# Patient Record
Sex: MALE | Race: OTHER | URBAN - METROPOLITAN AREA
[De-identification: names, ages, dates, MRNs, and addresses within clinical notes are randomized per-mention and may not be internally consistent; named-entity substitution may affect disease eponyms.]

---

## 2018-04-24 ENCOUNTER — OFFICE VISIT (OUTPATIENT)
Dept: FAMILY MEDICINE CLINIC | Age: 38
End: 2018-04-24

## 2018-04-24 ENCOUNTER — HOSPITAL ENCOUNTER (OUTPATIENT)
Dept: LAB | Age: 38
Discharge: HOME | End: 2018-04-24

## 2018-04-24 VITALS
SYSTOLIC BLOOD PRESSURE: 112 MMHG | DIASTOLIC BLOOD PRESSURE: 70 MMHG | HEIGHT: 65 IN | TEMPERATURE: 97.8 F | HEART RATE: 50 BPM | WEIGHT: 141.6 LBS | BODY MASS INDEX: 23.59 KG/M2

## 2018-04-24 DIAGNOSIS — N50.811 PAIN IN RIGHT TESTICLE: Primary | ICD-10-CM

## 2018-04-24 DIAGNOSIS — N50.811 PAIN IN RIGHT TESTICLE: ICD-10-CM

## 2018-04-24 LAB
APPEARANCE UR: CLEAR
BILIRUB UR QL: NEGATIVE
COLOR UR: NORMAL
GLUCOSE UR STRIP.AUTO-MCNC: NEGATIVE MG/DL
HGB UR QL STRIP: NEGATIVE
KETONES UR QL STRIP.AUTO: NEGATIVE MG/DL
LEUKOCYTE ESTERASE UR QL STRIP.AUTO: NEGATIVE
NITRITE UR QL STRIP.AUTO: NEGATIVE
PH UR STRIP: 6.5 [PH] (ref 5–8)
PROT UR STRIP-MCNC: NEGATIVE MG/DL
SP GR UR REFRACTOMETRY: 1.01 (ref 1–1.03)
UROBILINOGEN UR QL STRIP.AUTO: 0.2 EU/DL (ref 0.2–1)

## 2018-04-24 PROCEDURE — 87147 CULTURE TYPE IMMUNOLOGIC: CPT | Performed by: NURSE PRACTITIONER

## 2018-04-24 PROCEDURE — 87086 URINE CULTURE/COLONY COUNT: CPT | Performed by: NURSE PRACTITIONER

## 2018-04-24 PROCEDURE — 81003 URINALYSIS AUTO W/O SCOPE: CPT | Performed by: NURSE PRACTITIONER

## 2018-04-24 PROCEDURE — 87491 CHLMYD TRACH DNA AMP PROBE: CPT | Performed by: NURSE PRACTITIONER

## 2018-04-24 RX ORDER — NAPROXEN 500 MG/1
500 TABLET ORAL
Qty: 40 TAB | Refills: 1 | Status: SHIPPED | OUTPATIENT
Start: 2018-04-24 | End: 2018-06-12

## 2018-04-24 NOTE — PROGRESS NOTES
Printed AVS, provided to pt and reviewed. Pt indicated understanding and had no questions. Told pt that rx's have been sent to pharmacy and they should be ready for  in approximately 2 hrs. Reviewed medication ordered today with the pt.  #[id number].  Jonatan Haynes RN

## 2018-04-24 NOTE — PROGRESS NOTES
Subjective:     Chief Complaint   Patient presents with    Testicle Pain     Right side x 1 week        He  is a 43 y.o. male who presents for evaluation of testicular pain. Onset approx 1-2 weeks ago. No acute injury nor trauma to testicles, pain mostly in R. Pt unable to recall circumstances of first onset of pain, believes he was working. Pain only in R, L is WNL/non-painful. Pain is intermittent. Pain is worse while lifting heavy items, usually 50-80#. Pt works in construction/zita work. Notes some mild swelling,     No assoc dysuria, penile discharge nor frequency. Denies any palpable nodules/cyst on testicles. ROS  Gen - no fever/chills  Resp - no dyspnea or cough  CV - no chest pain or BENNETT  Rest per HPI    No past medical history on file. No past surgical history on file. No current outpatient prescriptions on file prior to visit. No current facility-administered medications on file prior to visit. Objective:     Vitals:    04/24/18 1202   BP: 112/70   Pulse: (!) 50   Temp: 97.8 °F (36.6 °C)   TempSrc: Oral   Weight: 141 lb 9.6 oz (64.2 kg)   Height: 5' 4.76\" (1.645 m)       Physical Examination:  General appearance - alert, well appearing, and in no distress  Eyes -sclera anicteric  Neck - supple, no significant adenopathy, no thyromegaly  Chest - clear to auscultation, no wheezes, rales or rhonchi, symmetric air entry  Heart - normal rate, regular rhythm, normal S1, S2, no murmurs, rubs, clicks or gallops  Neurological - alert, oriented, no focal findings or movement disorder noted  Abdomen-BS present/WNL x 4 quads, non-tender/distended, soft,no organomegaly    : testicles WNL, unable to appreciate mass nor hernia. No penile discharge, uncircumcised. Abdominal exam unremarkable. Assessment/ Plan:   Diagnoses and all orders for this visit:    1. Pain in right testicle  -     CHLAMYDIA/GC PCR; Future  -     CULTURE, URINE;  Future  -     URINALYSIS W/ RFLX MICROSCOPIC; Future  -     naproxen (NAPROSYN) 500 mg tablet; Take 1 Tab by mouth two (2) times daily as needed. Take w/ food. [place] 1 tableta por boca 2 veces al trina con comida por necesidad para dolor. symptoms appear to be hernia like, but unable to appreciate any hernia on /abdominal exam.     PRN Naproxen for discomfort and non-pharm remedies (heat/ice etc) for pain. Discussed emergency S&S warranting ER eval.     Re-eval in 4-6 weeks, may consider imaging if pain is not improved/resolving. I have discussed the diagnosis with the patient and the intended plan as seen in the above orders. The patient has received an after-visit summary and questions were answered concerning future plans. I have discussed medication side effects and warnings with the patient as well. The patient verbalizes understanding and agreement with the plan.     Follow-up Disposition: Not on File

## 2018-04-24 NOTE — PATIENT INSTRUCTIONS
Dolor testicular: Instrucciones de cuidado - [ Testicular Pain: Care Instructions ]  Instrucciones de cuidado    El dolor en los testículos puede deberse a muchas cosas. Estas incluyen jb lesión en los testículos, jb infección y la torsión testicular. Las lesiones y los problemas genitales suelen ocurrir regina actividades deportivas o recreativas, en el trabajo o en jb caída. El dolor causado por jb lesión suele desaparecer rápidamente. Por lo general, no hay daño a terri plazo para los testículos. Las infecciones que pueden causar dolor incluyen:  · Jb infección de los testículos. [place] se conoce hugo orquitis. · Un absceso en el escroto o los testículos. · Algunas infecciones de transmisión sexual (STI, por summer siglas en inglés). · Jb inflamación del tubo conectado a un testículo. Esta inflamación se conoce hugo epididimitis. Puede causar dolor y a veces es causada por jb infección. La torsión testicular ocurre cuando un testículo se tuerce en el cordón espermático. [place] interrumpe el suministro de carmen al testículo. La torsión testicular es jb afección grave que requiere [place]. La atención de seguimiento es jb parte clave de garay tratamiento y seguridad. Asegúrese de hacer y acudir a todas las citas, y llame a garay médico si está teniendo problemas. También es jb buena idea saber los resultados de los exámenes y mantener jb lista de los medicamentos que dex. ¿Cómo puede cuidarse en el hogar? · Descanse y proteja summer testículos e kourtney. Interrumpa, modifique o suspenda cualquier actividad que pueda estar causándole dolor o sensibilidad. · Colóquese hielo o jb compresa fría en la carla por 10 a 20 minutos cada vez. Coloque un paño villeda entre el hielo y la piel. · Use calzoncillos, no bóxers. Los calzoncillos ayudan a apoyar la carla lesionada. Puede usar un suspensorio si le ayuda a aliviar el dolor. · Si garay médico le recetó antibióticos, tómelos según las indicaciones.  No deje de tomarlos solo porque se sienta mejor. Debe jean pierre todos los antibióticos hasta terminarlos. · Pregúntele a garay médico si puede jean pierre un analgésico (medicamento para el dolor) de venta el, hugo acetaminofén (Tylenol), ibuprofeno (Advil, Motrin) o naproxeno (Aleve). Sea yesenia con los medicamentos. Qi y siga todas las instrucciones de la [place]. · Si el médico le recetó un analgésico, tómelo según las indicaciones. ¿Cuándo debe pedir ayuda? Llame a garay médico ahora mismo o busque atención médica inmediata si:  ? · Tiene dolor intenso o en aumento. ? · Nota un cambio en cómo se bakari summer testículos o se sitúan en garay escroto. ? · Nota nueva o peor inflamación en el escroto. ? · Tiene síntomas de un problema urinario, tales hugo jb infección de las vías urinarias. Estos pueden incluir:  ¨ Dolor o ardor al orinar. ¨ Necesidad de orinar con frecuencia sin poder eliminar mucha orina. ¨ Dolor en el flanco, que se encuentra eleazar debajo de la caja torácica y [place] de la cintura en ambos lados de la espalda. ¨ Harvinder en la orina. Kailyn Raid. ? Vigile muy de cerca los cambios en garay cesra, y asegúrese de comunicarse con garay médico si:  ? · No mejora hugo se esperaba. ¿Dónde puede encontrar más información en inglés? [place] a http://le-brandi.info/. Arash Sanchez P977 en la búsqueda para aprender Armen Stroud de \"Dolor testicular: Instrucciones de cuidado - [ Testicular Pain: Care Instructions ]. \"  Revisado: 12 lee, 2017  Versión del contenido: 11.4  © 9289-9448 Healthwise, Incorporated. Las instrucciones de cuidado fueron adaptadas bajo licencia por [employer] (which disclaims liability or warranty for this information). Si usted tiene [place] afección médica o sobre estas instrucciones, siempre pregunte a garay profesional de cesar. Healthwise, Incorporated niega toda garantía o responsabilidad por garay uso de esta información.

## 2018-04-24 NOTE — PROGRESS NOTES
Coordination of Care  1. Have you been to the ER, urgent care clinic since your last visit? Hospitalized since your last visit? No    2. Have you seen or consulted any other health care providers outside of the [facility] since your last visit? Include any pap smears or colon screening. No    Does the patient need refills? NO    Learning Assessment Complete?  yes  Depression Screening complete in the past 12 months? no

## 2018-04-24 NOTE — MR AVS SNAPSHOT
[address] [place] [address] 210 1400 [address] [place] [address] 
[phone] Patient: Bernadette Plasencia MRN: [id number] :[id number] Visit Information Rosetta Smith Personal Médico Departamento Teléfono del Dep. Número de visita 2018 11:00 AM Jil Low NP [facility] [phone] Upcoming Health Maintenance Date Due DTaP/Tdap/Td series (1 - Tdap) 2000 Influenza Age 5 to Adult 2017 Alerg[place]  Review Complete El: 2018 Por: Jil Lwo NP  
 [facility] Chaka del:  2018 No Known Allergies Vacunas actuales Melinda Ready No hay ninguna vacuna archivada. No revisadas esta visita You Were Diagnosed With   
  
 [facility] Pain in right testicle    -  Primary ICD-10-CM: N50.811 ICD-9-CM: 608.9 Partes vitales PS Pulso Temperatura [place] ( percentil de crecimiento) Peso (percentil de crecimiento) BMI ([facility])  
 112/70 (BP 1 Location: Left arm, BP Patient Position: Sitting) (!) 50 97.8 °F (36.6 °C) (Oral) 5' 4.76\" (1.645 m) 141 lb 9.6 oz (64.2 kg) 23.74 kg/m2 Estatus de tabaquísmo Never Smoker Historial de signos vitales BMI and BSA Data Body Mass Index Body Surface Area  
 23.74 kg/m 2 1.71 m 2 Sara Gutiérrez Pharmacy Name Phone  [facility], [address] [place] [address] [address] [facility] [phone] Cantu lista de medicamentos actualizada Dawna Po actualizada 18  1:35 PM.  Susan Trent use cantu lista de medicamentos más reciente. naproxen 500 mg tablet También conocido hugo:  NAPROSYN Take 1 Tab by mouth two (2) times daily as needed. Take w/ food. [place] 1 tableta por boca 2 veces al trina con comida por necesidad para dolor. Recetas Enviado a la [place]  Refills  
 naproxen (NAPROSYN) 500 mg tablet 1  
 Sig: Take 1 Tab by mouth two (2) times daily as needed. Take w/ food. [place] 1 tableta por boca 2 veces al trina con comida por necesidad para dolor. Class: Normal  
 Pharmacy: [facility] [address], [address] #: [phone] Route: Oral  
  
Por hacer 04/24/2018 Lab:  CHLAMYDIA/GC PCR   
  
 04/24/2018 Microbiology:  CULTURE, URINE   
  
 04/24/2018 Lab:  URINALYSIS W/ RFLX MICROSCOPIC Instrucciones para el Paciente Dolor testicular: Instrucciones de cuidado - [ Testicular Pain: Care Instructions ] Instrucciones de cuidado El dolor en los testículos puede deberse a muchas cosas. Estas incluyen jb lesión en los testículos, jb infección y la torsión testicular. Las lesiones y los problemas genitales suelen ocurrir regina actividades deportivas o recreativas, en el trabajo o en jb caída. El dolor causado por jb lesión suele desaparecer rápidamente. Por lo general, no hay daño a terri plazo para los testículos. Las infecciones que pueden causar dolor incluyen: · Jb infección de los testículos. [place] se conoce hugo orquitis. · Un absceso en el escroto o los testículos. · Algunas infecciones de transmisión sexual (STI, por summer siglas en inglés). · Jb inflamación del tubo conectado a un testículo. Esta inflamación se conoce hugo epididimitis. Puede causar dolor y a veces es causada por jb infección. La torsión testicular ocurre cuando un testículo se tuerce en el cordón espermático. [place] interrumpe el suministro de carmen al testículo. La torsión testicular es jb afección grave que requiere [place]. La atención de seguimiento es jb parte clave de garay tratamiento y seguridad. Asegúrese de hacer y acudir a todas las citas, y llame a garay médico si está teniendo problemas. También es jb buena idea saber los resultados de los exámenes y mantener jb lista de los medicamentos que dex. Cómo puede cuidarse en el hogar? · Descanse y proteja summer testículos e kourtney. Interrumpa, modifique o suspenda cualquier actividad que pueda estar causándole dolor o sensibilidad. · Colóquese hielo o jb compresa fría en la carla por 10 a 20 minutos cada vez. Coloque un paño villeda entre el hielo y la piel. · Use calzoncillos, no bóxers. Los calzoncillos ayudan a apoyar la carla lesionada. Puede usar un suspensorio si le ayuda a aliviar el dolor. · Si garay médico le recetó antibióticos, tómelos según las indicaciones. No deje de tomarlos solo porque se sienta mejor. Debe jean pierre todos los antibióticos hasta terminarlos. · Pregúntele a garay médico si puede jean pierre un analgésico (medicamento para el dolor) de venta el, hugo acetaminofén (Tylenol), ibuprofeno (Advil, Motrin) o naproxeno (Aleve). Sea yesenia con los medicamentos. Qi y siga todas las instrucciones de la [place]. · Si el médico le recetó un analgésico, tómelo según las indicaciones. Cuándo debe pedir ayuda? Llame a garay médico ahora mismo o busque atención médica inmediata si: 
? · Tiene dolor intenso o en aumento. ? · Nota un cambio en cómo se bakari summer testículos o se sitúan en garay escroto. ? · Nota nueva o peor inflamación en el escroto. ? · Tiene síntomas de un problema urinario, tales hugo jb infección de las vías urinarias. Estos pueden incluir: ¨ Dolor o ardor al orinar. ¨ Necesidad de orinar con frecuencia sin poder eliminar mucha orina. ¨ Dolor en el flanco, que se encuentra eleazar debajo de la caja torácica y [place] de la cintura en ambos lados de la espalda. ¨ Harvinder en la orina. Bernadette Pio. ? Vigile muy de cerca los cambios en garay cesar, y asegúrese de comunicarse con garay médico si: 
? · No mejora hugo se esperaba. Dónde puede encontrar más información en inglés? Maylin Wood a http://le-brandi.info/. [facility] Y219 en la búsqueda para aprender Mike Casas de \"Dolor testicular: Instrucciones de cuidado - [ Testicular Pain: Care Instructions ]. \" Revisado: 12 [place], 2017 Versión del contenido: 11.4 © 5310-3051 Healthwise, Incorporated. Las instrucciones de cuidado fueron adaptadas bajo licencia por [employer] (which disclaims liability or warranty for this information). Si usted tiene [place] afección médica o sobre estas instrucciones, siempre pregunte a garay profesional de cesar. Healthwise, Incorporated niega toda garantía o responsabilidad por garay uso de esta información. Introducing [facility]! [facility] introduce portal paciente MyChart . Ahora se puede acceder a partes de garay expediente médico, enviar por correo electrónico la oficina de garay médico y solicitar renovaciones de medicamentos en línea. En garay navegador de Internet , Angle Hurley a [email or web address] Chun clic en el usuario por Darian Holland? Elgie Fleming clic aquí en la sesión Laverda Warren. Verá la página de registro [place]. Ingrese garay código de [employer] moses y hugo aparece a continuación. Usted no tendrá que [employer] código después de srini completado el proceso de registro . Si usted no se inscribe antes de la fecha de caducidad , debe solicitar un nuevo código. · MyChart Código de acceso : [id number] Expires: 7/23/2018  1:35 PM 
 
Ingresa los últimos cuatro dígitos de garay Número de Seguro Social ( xxxx ) y fecha de nacimiento ( dd / mm / aaaa ) hugo se indica y chun clic en Enviar. Cherelle será llevado a la siguiente página de registro . Crear un ID MyChart . Esta será garay ID de inicio de sesión de MyChart y no puede ser [place] , por lo que pensar en jb que es [employer] [place] y fácil de recordar . Crear jb contraseña MyChart . Usted puede cambiar garay contraseña en cualquier momento . Ingrese garay Password Reset de preguntas y [place] . [place] se puede utilizar en un momento posterior si usted olvida garay contraseña.  
Introduzca garay dirección de correo electrónico . Olimpia Corral recibirá Tammy Ser notificación por correo electrónico cuando la nueva información está disponible en MyChart . Grabiel Slater clic en Registrarse. Sumit Nurse esther y descargar porciones de garay expediente médico. 
Jesse clic en el enlace de descarga del menú Resumen para descargar jb copia portátil de garay información médica . Si tiene [employer] , por favor visite la sección de preguntas frecuentes del sitio web [email or web address] . Recuerde, Revaluate NO es que se utilizará para las necesidades urgentes. Para emergencias médicas , llame al 911 . Ahora disponible en garay iPhone y Android ! Por favor proporcione denzel resumen de la documentación de cuidado a garay próximo proveedor. If you have any questions after today's visit, please call [phone].

## 2018-04-25 LAB
BACTERIA SPEC CULT: ABNORMAL
C TRACH DNA SPEC QL NAA+PROBE: NEGATIVE
CC UR VC: ABNORMAL
N GONORRHOEA DNA SPEC QL NAA+PROBE: NEGATIVE
SAMPLE TYPE: NORMAL
SERVICE CMNT-IMP: ABNORMAL
SERVICE CMNT-IMP: NORMAL
SPECIMEN SOURCE: NORMAL

## 2018-04-25 NOTE — PROGRESS NOTES
Pls inform Pt his urine was + for bacteria, which may be provoking his groin pain. Recommend Amox 500mg BID x one week. Pls msg me once Pt receives msg and I will send Abx to pharmacy.      Thank you,  Yaz Taveras

## 2018-05-07 ENCOUNTER — TELEPHONE (OUTPATIENT)
Dept: FAMILY MEDICINE CLINIC | Age: 38
End: 2018-05-07

## 2018-05-07 RX ORDER — AMOXICILLIN 500 MG/1
500 CAPSULE ORAL 2 TIMES DAILY
Qty: 14 CAP | Refills: 0 | Status: SHIPPED | OUTPATIENT
Start: 2018-05-07 | End: 2018-05-14

## 2018-05-07 NOTE — PROGRESS NOTES
Called and spoke with patient (name and  verified) Message made by provider reviewed with patient. [facility] [address].  Thanks Karthikeyan Erazo RN

## 2018-05-07 NOTE — TELEPHONE ENCOUNTER
----- Message from Joel Yañez RN sent at 2018  3:48 PM EDT -----  Delsadie Colon and spoke with patient (name and  verified) Message made by provider reviewed with patient. Please send abx to [facility] at [address].  Thanks Joel Yañez, RN

## 2018-06-12 ENCOUNTER — OFFICE VISIT (OUTPATIENT)
Dept: FAMILY MEDICINE CLINIC | Age: 38
End: 2018-06-12

## 2018-06-12 ENCOUNTER — HOSPITAL ENCOUNTER (OUTPATIENT)
Dept: LAB | Age: 38
Discharge: HOME | End: 2018-06-12

## 2018-06-12 VITALS
HEART RATE: 49 BPM | WEIGHT: 140 LBS | BODY MASS INDEX: 23.9 KG/M2 | DIASTOLIC BLOOD PRESSURE: 58 MMHG | SYSTOLIC BLOOD PRESSURE: 108 MMHG | TEMPERATURE: 97.8 F | HEIGHT: 64 IN

## 2018-06-12 DIAGNOSIS — Z87.440 HISTORY OF UTI: Primary | ICD-10-CM

## 2018-06-12 DIAGNOSIS — N50.811 RIGHT TESTICULAR PAIN: ICD-10-CM

## 2018-06-12 DIAGNOSIS — Z87.440 HISTORY OF UTI: ICD-10-CM

## 2018-06-12 PROCEDURE — 87086 URINE CULTURE/COLONY COUNT: CPT | Performed by: FAMILY MEDICINE

## 2018-06-12 PROCEDURE — 81003 URINALYSIS AUTO W/O SCOPE: CPT | Performed by: FAMILY MEDICINE

## 2018-06-12 RX ORDER — ACETAMINOPHEN 325 MG/1
TABLET ORAL
COMMUNITY

## 2018-06-12 NOTE — PROGRESS NOTES
Coordination of Care  1. Have you been to the ER, urgent care clinic since your last visit? Hospitalized since your last visit? No    2. Have you seen or consulted any other health care providers outside of the [address] [place] [address] since your last visit? Include any pap smears or colon screening. No    Does the patient need refills? YES    Learning Assessment Complete?  yes  Depression Screening complete in the past 12 months? yes

## 2018-06-12 NOTE — PROGRESS NOTES
Bernadette Plasencia is a 43 y.o. male    Issues discussed today include:    Chief Complaint   Patient presents with    Other     follow up for pain in right testicle. Pt states he is feeling much better.  Medication Refill       1) R testicle pain:  Felling much better. S/p amoxicillin x 1 week and prn naproxen. Only having pain infrequenty. Knows STI tests were neg, denies ongoing concern for STIs. Is monogamous with his wife. No penile d/c or lesions. Naproxen affects stomach, so tries not to take. Asks for refill of amox, thinking it helped him a lot. Data reviewed or ordered today:       Other problems include: There is no problem list on file for this patient. Medications:  Current Outpatient Prescriptions   Medication Sig Dispense Refill    acetaminophen (TYLENOL) 325 mg tablet Take  by mouth every four (4) hours as needed for Pain. Allergies:  No Known Allergies    LMP:  No LMP for male patient. Social History     Social History    Marital status:      Spouse name: N/A    Number of children: N/A    Years of education: N/A     Occupational History    Not on file. Social History Main Topics    Smoking status: Never Smoker    Smokeless tobacco: Never Used    Alcohol use 0.0 oz/week     0 Standard drinks or equivalent per week      Comment: social    Drug use: No    Sexual activity: Not on file     Other Topics Concern    Not on file     Social History Narrative       No family history on file.       Physical Exam   Visit Vitals    /58 (BP 1 Location: Left arm)    Pulse (!) 49    Temp 97.8 °F (36.6 °C) (Oral)    Ht 5' 4.37\" (1.635 m)    Wt 140 lb (63.5 kg)    BMI 23.76 kg/m2      BP Readings from Last 3 Encounters:   06/12/18 108/58   04/24/18 112/70   03/29/16 125/76     Constitutional: Appears well,  No acute distress, Vitals noted  Psychiatric:  Affect normal, Alert and Oriented to person/place/time  Eyes:  Conjunctiva clear, no drainage  ENT: External ears and nose normal, Mucous membranes moist  Neck:  General inspection normal. Supple. Lungs:  No respiratory distress   Extremities: Without edema, good peripheral pulses  Skin:  Warm to palpation, without rashes      Assessment/Plan:      ICD-10-CM ICD-9-CM    1. History of UTI Z87.440 V13.02 URINALYSIS W/ RFLX MICROSCOPIC      CULTURE, URINE   2. Right testicular pain N50.811 608.9 URINALYSIS W/ RFLX MICROSCOPIC      CULTURE, URINE    Improved, intermittent       Intermittent testicle pain, but much improved  Will recheck urine studies. No pt concern for STIs. Rec stopping NSAIDs and using tylenol only prn for pain      Follow-up Disposition:  Return if symptoms worsen or fail to improve.         Calli Antunez MD  [address] [place] [address], Heriberto Levi

## 2018-06-13 LAB
APPEARANCE UR: CLEAR
BILIRUB UR QL: NEGATIVE
COLOR UR: NORMAL
GLUCOSE UR STRIP.AUTO-MCNC: NEGATIVE MG/DL
HGB UR QL STRIP: NEGATIVE
KETONES UR QL STRIP.AUTO: NEGATIVE MG/DL
LEUKOCYTE ESTERASE UR QL STRIP.AUTO: NEGATIVE
NITRITE UR QL STRIP.AUTO: NEGATIVE
PH UR STRIP: 7.5 [PH] (ref 5–8)
PROT UR STRIP-MCNC: NEGATIVE MG/DL
SP GR UR REFRACTOMETRY: 1.01 (ref 1–1.03)
UROBILINOGEN UR QL STRIP.AUTO: 0.2 EU/DL (ref 0.2–1)

## 2018-06-14 LAB
BACTERIA SPEC CULT: NORMAL
CC UR VC: NORMAL
SERVICE CMNT-IMP: NORMAL